# Patient Record
Sex: FEMALE | Race: WHITE
[De-identification: names, ages, dates, MRNs, and addresses within clinical notes are randomized per-mention and may not be internally consistent; named-entity substitution may affect disease eponyms.]

---

## 2019-06-19 NOTE — CRLMY
INDICATION: bilateral screening mammogram asymptomatic 63 year old





been obtained using full-field digital technique.  These mammographic 

images were interpreted with the benefit of computer-aided detection.



COMPARISON FILM: 06/07/18, 05/23/17, 12/23/16(L), 04/04/16, 03/30/15.



FINDINGS: 



The breast tissue is almost entirely fat.



There are no masses or calcifications that are suspicious for malignancy.



IMPRESSION: There is no radiographic evidence for malignancy. 



ASSESSMENT: BI-RADS Category 1: Negative



RECOMMENDATION:  Routine screening mammogram in 1 year.



A lay language report of this examination will be provided to the patient. 



Breast Tomosynthesis was used in this interpretation.



[email or web address]  



Dictated by: Jones Holden MD @ 06/19/2019 11:48:59



(Electronically Signed)

## 2020-04-27 ENCOUNTER — HOSPITAL ENCOUNTER (EMERGENCY)
Dept: HOSPITAL 11 - JP.ED | Age: 65
LOS: 1 days | Discharge: HOME | End: 2020-04-28
Payer: COMMERCIAL

## 2020-04-27 DIAGNOSIS — Z79.82: ICD-10-CM

## 2020-04-27 DIAGNOSIS — M25.512: ICD-10-CM

## 2020-04-27 DIAGNOSIS — Z79.899: ICD-10-CM

## 2020-04-27 DIAGNOSIS — R07.9: Primary | ICD-10-CM

## 2020-04-27 PROCEDURE — 84484 ASSAY OF TROPONIN QUANT: CPT

## 2020-04-27 PROCEDURE — 83690 ASSAY OF LIPASE: CPT

## 2020-04-27 PROCEDURE — 85025 COMPLETE CBC W/AUTO DIFF WBC: CPT

## 2020-04-27 PROCEDURE — 36415 COLL VENOUS BLD VENIPUNCTURE: CPT

## 2020-04-27 PROCEDURE — 93005 ELECTROCARDIOGRAM TRACING: CPT

## 2020-04-27 PROCEDURE — 99285 EMERGENCY DEPT VISIT HI MDM: CPT

## 2020-04-27 PROCEDURE — 71045 X-RAY EXAM CHEST 1 VIEW: CPT

## 2020-04-27 PROCEDURE — 80053 COMPREHEN METABOLIC PANEL: CPT

## 2020-04-27 NOTE — EDM.PDOC
ED HPI GENERAL MEDICAL PROBLEM





- General


Chief Complaint: General


Stated Complaint: LEFT SHOULDER/BACK PAIN


Time Seen by Provider: 04/27/20 23:00


Source of Information: Reports: Patient


History Limitations: Reports: No Limitations





- History of Present Illness


INITIAL COMMENTS - FREE TEXT/NARRATIVE: 





Patient presents describing left-sided chest, shoulder, shoulder blade 

discomfort beginning approximately 2200 hrs. tonight. She had eaten some 

popcorn not too long before symptoms began. She noticed the left chest symptoms 

come on relatively shortly afterwards. She is already status post 

cholecystectomy. She does not have any right sided chest discomfort. No 

shortness of breath. She does not think this was acid reflux as she felt as 

though she needed to burp/belch. When she did so, her symptoms improved 

somewhat. She has had other episodes with these same left-sided symptoms over 

the last couple of weeks. A couple of times, she has been sweaty during their 

occurrence. She is not taken any medication that his made a difference in 

symptoms when it began and the episodes resolve on their own. She did take 162 

mg of aspirin prior to coming here tonight.


Onset: Today, Sudden (One)


Duration: Hour(s): (One)


Location: Reports: Chest


Quality: Reports: Burning, Dull


Severity: Moderate


Improves with: Reports: None


Worsens with: Reports: None


Associated Symptoms: Reports: No Other Symptoms


  ** Left Shoulder


Pain Score (Numeric/FACES): 2





- Related Data


 Allergies











Allergy/AdvReac Type Severity Reaction Status Date / Time


 


No Known Allergies Allergy   Verified 04/27/20 22:59











Home Meds: 


 Home Meds





Aspirin [Nuzhat Chewable] 81 mg PO DAILY 04/27/20 [History]


Ibuprofen 800 mg PO DAILY 04/27/20 [History]


Levothyroxine [Synthroid] 88 mg PO DAILY 04/27/20 [History]


Pravastatin Sodium 80 mg PO DAILY 04/27/20 [History]











ED ROS GENERAL





- Review of Systems


Review Of Systems: See Below


Constitutional: Denies: Fever, Chills, Night Sweats


Respiratory: Reports: No Symptoms


Cardiovascular: Reports: Chest Pain (Left sided)


GI/Abdominal: Reports: No Symptoms


Musculoskeletal: Reports: Shoulder Pain (Left)


Psychiatric: Reports: No Symptoms





ED EXAM, GENERAL





- Physical Exam


Exam: See Below


Exam Limited By: No Limitations


General Appearance: Alert, No Apparent Distress


Head: Atraumatic


Neck: Supple, Full Range of Motion


Respiratory/Chest: No Respiratory Distress


Cardiovascular: Normal Peripheral Pulses, Regular Rate, Rhythm, No Murmur


GI/Abdominal: Soft, Non-Tender


Extremities: Normal Inspection





EKG INTERPRETATION


EKG Date: 04/27/20


Time: 22:50


Rhythm: NSR


Rate (Beats/Min): 71


Axis: Normal


P-Wave: Present


QRS: Normal


ST-T: Normal


QT: Normal





Course





- Vital Signs


Last Recorded V/S: 


 Last Vital Signs











Temp  36.1 C   04/27/20 22:58


 


Pulse  64   04/28/20 00:43


 


Resp  16   04/28/20 00:43


 


BP  120/68   04/28/20 00:43


 


Pulse Ox  99   04/28/20 00:43














- Orders/Labs/Meds


Orders: 


 Active Orders 24 hr











 Category Date Time Status


 


 EKG Documentation Completion [RC] ASDIRECTED Care  04/27/20 23:03 Active


 


 Chest 1V Frontal [CR] Stat Exams  04/27/20 23:04 Taken


 


 Saline Lock Insert [OM.PC] Routine Oth  04/27/20 23:02 Ordered


 


 EKG 12 Lead [EK] Routine Ther  04/27/20 23:02 Ordered











Labs: 


 Laboratory Tests











  04/27/20 04/27/20 Range/Units





  23:15 23:15 


 


WBC  5.9   (4.5-11.0)  K/uL


 


RBC  4.19   (3.30-5.50)  M/uL


 


Hgb  12.9   (12.0-15.0)  g/dL


 


Hct  38.4   (36.0-48.0)  %


 


MCV  92   (80-98)  fL


 


MCH  31   (27-31)  pg


 


MCHC  34   (32-36)  %


 


Plt Count  190   (150-400)  K/uL


 


Neut % (Auto)  44   (36-66)  %


 


Lymph % (Auto)  43   (24-44)  %


 


Mono % (Auto)  10 H   (2-6)  %


 


Eos % (Auto)  2   (2-4)  %


 


Baso % (Auto)  0   (0-1)  %


 


Sodium   141  (140-148)  mmol/L


 


Potassium   4.0  (3.6-5.2)  mmol/L


 


Chloride   104  (100-108)  mmol/L


 


Carbon Dioxide   27  (21-32)  mmol/L


 


Anion Gap   10.3  (5.0-14.0)  mmol/L


 


BUN   23 H  (7-18)  mg/dL


 


Creatinine   1.2 H  (0.6-1.0)  mg/dL


 


Est Cr Clr Drug Dosing   42.86  mL/min


 


Estimated GFR (MDRD)   46 L  (>60)  


 


Glucose   125 H  ()  mg/dL


 


Calcium   8.6  (8.5-10.1)  mg/dL


 


Total Bilirubin   0.5  (0.2-1.0)  mg/dL


 


AST   18  (15-37)  U/L


 


ALT   34  (12-78)  U/L


 


Alkaline Phosphatase   72  ()  U/L


 


Troponin I   < 0.017  (0.000-0.056)  ng/mL


 


Total Protein   6.9  (6.4-8.2)  g/dL


 


Albumin   3.5  (3.4-5.0)  g/dL


 


Globulin   3.4  (2.3-3.5)  g/dL


 


Albumin/Globulin Ratio   1.0 L  (1.2-2.2)  


 


Lipase   175  ()  U/L











Meds: 


Medications














Discontinued Medications














Generic Name Dose Route Start Last Admin





  Trade Name Freq  PRN Reason Stop Dose Admin


 


Aspirin  162 mg  04/27/20 23:01  04/27/20 23:17





  Aspirin  PO  04/27/20 23:02  162 mg





  ONETIME ONE   Administration





     





     





     





     


 


Sodium Chloride  10 ml  04/27/20 23:02  04/27/20 23:17





  Saline Flush  FLUSH   10 ml





  ASDIRECTED PRN   Administration





  Keep Vein Open   





     





     





     














- Re-Assessments/Exams


Free Text/Narrative Re-Assessment/Exam: 





04/27/20 23:08


Her symptoms could be gastrointestinal in relation but also could represent 

cardiac symptoms. She'll be given an additional 162 mg of aspirin and workup 

will  proceed.


04/28/20 03:21


At recheck, all of her studies were within normal range. She is largely symptom-

free at this time. She states that several years ago she underwent a Cardiolite 

stress test and subsequent that a coronary angiogram. She states that the 

angiogram was unremarkable although does not recall the specific details. We 

discussed repeating a troponin at 4 hours to make sure things are changing. She 

declines that as she is feeling quite a bit better. There still is some 

residual shoulder pain which she is thinking may be chronic musculoskeletal 

rather than referred pain? I discussed trying a dose of nitroglycerin to see if 

her symptoms improved but she also declined that. At this point, she will be 

discharged and will discuss stress testing with her primary care team. Although 

her annual physical occurs in June, I recommend that she discuss stress testing 

with her team prior to that point. No new medications were prescribed. She was 

discharged in good condition and told she could return at any time if feeling 

worse in anyway.





Departure





- Departure


Time of Disposition: 00:43


Disposition: Home, Self-Care 01


Condition: Good


Clinical Impression: 


Chest pain


Qualifiers:


 Chest pain type: unspecified Qualified Code(s): R07.9 - Chest pain, unspecified





Shoulder pain, left


Qualifiers:


 Chronicity: unspecified Qualified Code(s): M25.512 - Pain in left shoulder








- Discharge Information


*PRESCRIPTION DRUG MONITORING PROGRAM REVIEWED*: Not Applicable


*COPY OF PRESCRIPTION DRUG MONITORING REPORT IN PATIENT GAIL: Not Applicable


Instructions:  Nonspecific Chest Pain, Adult, Exercise Stress Test


Referrals: 


Charisse Carney PA [Primary Care Provider] - 


Forms:  ED Department Discharge


Additional Instructions: 


Continue current medications. Discuss scheduling a cardiac stress test (

Cardiolite) with your primary care team. If these symptoms return and worsen, 

return to this department at any time.





Sepsis Event Note





- Evaluation


Sepsis Screening Result: No Definite Risk





- Focused Exam


Vital Signs: 


 Vital Signs











  Temp Pulse Resp BP Pulse Ox


 


 04/28/20 00:43   64  16  120/68  99


 


 04/27/20 22:58  36.1 C  67  16  122/67  97


 


 04/27/20 22:50  37.7 C  68  16  148/60 H  95











Date Exam was Performed: 04/28/20


Time Exam was Performed: 03:12





- My Orders


Last 24 Hours: 


My Active Orders





04/27/20 23:02


Saline Lock Insert [OM.PC] Routine 


EKG 12 Lead [EK] Routine 





04/27/20 23:03


EKG Documentation Completion [RC] ASDIRECTED 





04/27/20 23:04


Chest 1V Frontal [CR] Stat 














- Assessment/Plan


Last 24 Hours: 


My Active Orders





04/27/20 23:02


Saline Lock Insert [OM.PC] Routine 


EKG 12 Lead [EK] Routine 





04/27/20 23:03


EKG Documentation Completion [RC] ASDIRECTED 





04/27/20 23:04


Chest 1V Frontal [CR] Stat

## 2020-04-28 NOTE — CR
CHEST: Portable 4/27/2020 at 11:41 PM

 

CLINICAL HISTORY:Chest pain

 

COMPARISON:None

 

FINDINGS:  The heart size, pulmonary vascularity and hilar structures are

normal. No infiltrate effusion or pneumothorax is seen.

 

IMPRESSION: No acute cardiopulmonary process.

## 2021-08-19 ENCOUNTER — HOSPITAL ENCOUNTER (OUTPATIENT)
Dept: HOSPITAL 11 - JP.SDS | Age: 66
Discharge: HOME | End: 2021-08-19
Attending: SURGERY
Payer: COMMERCIAL

## 2021-08-19 DIAGNOSIS — K57.30: ICD-10-CM

## 2021-08-19 DIAGNOSIS — K64.9: ICD-10-CM

## 2021-08-19 DIAGNOSIS — E78.00: ICD-10-CM

## 2021-08-19 DIAGNOSIS — Z12.11: Primary | ICD-10-CM

## 2021-08-19 PROCEDURE — 45378 DIAGNOSTIC COLONOSCOPY: CPT

## 2021-09-05 NOTE — OR
DATE OF PROCEDURE:  08/19/2021

 

SURGEON:  Abhinav Buckley MD

 

PREOPERATIVE DIAGNOSIS:  Indications for screening colonoscopy.

 

POSTOPERATIVE DIAGNOSIS:  Limited left colonic diverticulosis.

 

OPERATIVE PROCEDURE:  Flexible colonoscopy.

 

ANESTHESIA:  IV sedation.

 

INDICATION FOR PROCEDURE:  A 65-year-old female presenting for screening colonoscopy.  She

has neither family or personal history of colon neoplasia.  Plan is to proceed with

colonoscopy with biopsies and polypectomy as indicated.  Potential risks including bleeding

and perforation were discussed, and the patient wishes to proceed.

 

DETAILS OF PROCEDURE:  The patient was taken to the operating room and placed in a left

lateral decubitus position.  IV sedation was administered, after which the initial digital

rectal exam was performed and was unremarkable.  Colonoscope was then passed into the rectum

with retroflexion revealing uncomplicated hemorrhoidal columns.  Scope was eventually passed

to the level of the cecum.  The prep was fairly good with only small amount of liquid stool

was present.  The examination found some left colonic diverticulosis, which was otherwise

uncomplicated.  Otherwise, there were no other areas of colitis.  No polyps or other signs

of neoplasia.  Scope was then withdrawn and the above findings reconfirmed, and the

procedure concluded.  The patient was taken to the recovery room in satisfactory condition.

There were no evident complications.

 

Given the lack of family or personal history of colon neoplasia, the next colonoscopy should

be considered in 10 years.

 

 

 

 

Abhinav Buckley MD

DD:  09/04/2021 18:25:07

DT:  09/05/2021 14:19:42

Job #:  [id number]

## 2023-01-08 ENCOUNTER — HOSPITAL ENCOUNTER (EMERGENCY)
Dept: HOSPITAL 11 - JP.ED | Age: 68
Discharge: HOME | End: 2023-01-08
Payer: COMMERCIAL

## 2023-01-08 DIAGNOSIS — Z79.82: ICD-10-CM

## 2023-01-08 DIAGNOSIS — Z79.899: ICD-10-CM

## 2023-01-08 DIAGNOSIS — E78.00: ICD-10-CM

## 2023-01-08 DIAGNOSIS — Z90.49: ICD-10-CM

## 2023-01-08 DIAGNOSIS — G40.909: Primary | ICD-10-CM

## 2023-01-08 DIAGNOSIS — Z86.16: ICD-10-CM
